# Patient Record
Sex: MALE | Race: WHITE | NOT HISPANIC OR LATINO | Employment: FULL TIME | URBAN - METROPOLITAN AREA
[De-identification: names, ages, dates, MRNs, and addresses within clinical notes are randomized per-mention and may not be internally consistent; named-entity substitution may affect disease eponyms.]

---

## 2025-04-15 ENCOUNTER — APPOINTMENT (OUTPATIENT)
Dept: RADIOLOGY | Facility: HOSPITAL | Age: 65
End: 2025-04-15
Payer: COMMERCIAL

## 2025-04-15 ENCOUNTER — HOSPITAL ENCOUNTER (EMERGENCY)
Facility: HOSPITAL | Age: 65
Discharge: HOME | End: 2025-04-15
Payer: COMMERCIAL

## 2025-04-15 VITALS
BODY MASS INDEX: 21.48 KG/M2 | HEART RATE: 80 BPM | RESPIRATION RATE: 17 BRPM | DIASTOLIC BLOOD PRESSURE: 92 MMHG | TEMPERATURE: 97.9 F | SYSTOLIC BLOOD PRESSURE: 184 MMHG | OXYGEN SATURATION: 97 % | HEIGHT: 69 IN | WEIGHT: 145 LBS

## 2025-04-15 DIAGNOSIS — M25.511 ACUTE PAIN OF RIGHT SHOULDER: Primary | ICD-10-CM

## 2025-04-15 PROCEDURE — 73000 X-RAY EXAM OF COLLAR BONE: CPT | Mod: RT

## 2025-04-15 PROCEDURE — 99284 EMERGENCY DEPT VISIT MOD MDM: CPT

## 2025-04-15 PROCEDURE — 73030 X-RAY EXAM OF SHOULDER: CPT | Mod: RT

## 2025-04-15 PROCEDURE — 2500000001 HC RX 250 WO HCPCS SELF ADMINISTERED DRUGS (ALT 637 FOR MEDICARE OP)

## 2025-04-15 PROCEDURE — 73030 X-RAY EXAM OF SHOULDER: CPT | Mod: RIGHT SIDE | Performed by: STUDENT IN AN ORGANIZED HEALTH CARE EDUCATION/TRAINING PROGRAM

## 2025-04-15 PROCEDURE — 73000 X-RAY EXAM OF COLLAR BONE: CPT | Mod: RIGHT SIDE | Performed by: STUDENT IN AN ORGANIZED HEALTH CARE EDUCATION/TRAINING PROGRAM

## 2025-04-15 RX ORDER — IBUPROFEN 600 MG/1
600 TABLET ORAL EVERY 6 HOURS PRN
Qty: 21 TABLET | Refills: 0 | Status: SHIPPED | OUTPATIENT
Start: 2025-04-15 | End: 2025-04-22

## 2025-04-15 RX ORDER — IBUPROFEN 600 MG/1
600 TABLET ORAL ONCE
Status: COMPLETED | OUTPATIENT
Start: 2025-04-15 | End: 2025-04-15

## 2025-04-15 RX ORDER — METHOCARBAMOL 500 MG/1
500 TABLET, FILM COATED ORAL 2 TIMES DAILY
Qty: 14 TABLET | Refills: 0 | Status: SHIPPED | OUTPATIENT
Start: 2025-04-15 | End: 2025-04-22

## 2025-04-15 RX ADMIN — IBUPROFEN 600 MG: 600 TABLET ORAL at 08:59

## 2025-04-15 ASSESSMENT — PAIN DESCRIPTION - LOCATION: LOCATION: SHOULDER

## 2025-04-15 ASSESSMENT — LIFESTYLE VARIABLES
EVER FELT BAD OR GUILTY ABOUT YOUR DRINKING: NO
EVER HAD A DRINK FIRST THING IN THE MORNING TO STEADY YOUR NERVES TO GET RID OF A HANGOVER: NO
TOTAL SCORE: 0
HAVE PEOPLE ANNOYED YOU BY CRITICIZING YOUR DRINKING: NO
HAVE YOU EVER FELT YOU SHOULD CUT DOWN ON YOUR DRINKING: NO

## 2025-04-15 ASSESSMENT — PAIN DESCRIPTION - ORIENTATION: ORIENTATION: RIGHT

## 2025-04-15 ASSESSMENT — PAIN DESCRIPTION - PAIN TYPE: TYPE: ACUTE PAIN

## 2025-04-15 ASSESSMENT — COLUMBIA-SUICIDE SEVERITY RATING SCALE - C-SSRS
6. HAVE YOU EVER DONE ANYTHING, STARTED TO DO ANYTHING, OR PREPARED TO DO ANYTHING TO END YOUR LIFE?: NO
2. HAVE YOU ACTUALLY HAD ANY THOUGHTS OF KILLING YOURSELF?: NO
1. IN THE PAST MONTH, HAVE YOU WISHED YOU WERE DEAD OR WISHED YOU COULD GO TO SLEEP AND NOT WAKE UP?: NO

## 2025-04-15 ASSESSMENT — PAIN - FUNCTIONAL ASSESSMENT: PAIN_FUNCTIONAL_ASSESSMENT: 0-10

## 2025-04-15 ASSESSMENT — PAIN SCALES - GENERAL: PAINLEVEL_OUTOF10: 10 - WORST POSSIBLE PAIN

## 2025-04-15 NOTE — ED PROVIDER NOTES
HPI   Chief Complaint   Patient presents with    Shoulder Injury       65 yr old male presents with R shoulder pain following an incident yesterday at work where a heavy gate started to fall and he reached out his R arm to try and stop it from falling. Directly after, he began experiencing moderate shoulder pain causing limited use and movement of shoulder. Denies numbness, tinging. Denies taking anticoags, any medications and has no known medical issues. Has not taken any medication for pain.               Patient History   History reviewed. No pertinent past medical history.  History reviewed. No pertinent surgical history.  No family history on file.  Social History     Tobacco Use    Smoking status: Not on file    Smokeless tobacco: Not on file   Substance Use Topics    Alcohol use: Not on file    Drug use: Not on file       Physical Exam   ED Triage Vitals [04/15/25 0758]   Temperature Heart Rate Respirations BP   36.6 °C (97.9 °F) 80 17 (!) 184/92      Pulse Ox Temp src Heart Rate Source Patient Position   97 % -- -- --      BP Location FiO2 (%)     -- --       Physical Exam  Constitutional:       Appearance: Normal appearance.   HENT:      Head: Normocephalic and atraumatic.      Mouth/Throat:      Pharynx: Oropharynx is clear.   Eyes:      Extraocular Movements: Extraocular movements intact.   Neck:      Comments: No pain to palpation, FAROM  Cardiovascular:      Rate and Rhythm: Normal rate and regular rhythm.      Pulses: Normal pulses.      Heart sounds: Normal heart sounds.   Pulmonary:      Effort: Pulmonary effort is normal.      Breath sounds: Normal breath sounds.   Musculoskeletal:         General: Tenderness present. No swelling, deformity or signs of injury.      Cervical back: Neck supple.      Comments: Tenderness to R shoulder with limited range of motion. No lacerations, swelling, or contusions.   Skin:     General: Skin is warm and dry.      Capillary Refill: Capillary refill takes less than  2 seconds.   Neurological:      Mental Status: He is alert and oriented to person, place, and time.   Psychiatric:         Mood and Affect: Mood normal.         Behavior: Behavior normal.           ED Course & MDM   Diagnoses as of 04/15/25 0939   Acute pain of right shoulder                 No data recorded     Alexander Coma Scale Score: 15 (04/15/25 0758 : Melita Kulkarni, PHILL)                           Medical Decision Making  65 yr old male with R shoulder pain following an accident at work where he reached out to stop a heavy gate from falling with his R arm. Denies falling, neck pain, headaches, chest pain, SOB or any other concerns outside of the shoulder pain today. He has limited ROM but pulses, cap refill and sensation remain intact in the RUE. Denies any medication use or chronic medical conditions. BP is elevated, potentially due to pain but encouraged pt to follow up with primary care for recheck. Pain managed with ibuprofen. Robaxin considered but pt declined. Xrays negative, discharged stable with a work note and referral to PCP and sports med/ortho.         Procedure  Procedures     Karolina Jefferson PA-C  04/15/25 0939

## 2025-04-15 NOTE — Clinical Note
Oemga Fox was seen and treated in our emergency department on 4/15/2025.  He may return to work on 04/17/2025.       If you have any questions or concerns, please don't hesitate to call.      Karolina Jefferson PA-C

## 2025-04-22 ENCOUNTER — OFFICE VISIT (OUTPATIENT)
Dept: ORTHOPEDIC SURGERY | Facility: CLINIC | Age: 65
End: 2025-04-22
Payer: COMMERCIAL

## 2025-04-22 DIAGNOSIS — S42.131A DISPLACED FRACTURE OF CORACOID PROCESS, RIGHT SHOULDER, INITIAL ENCOUNTER FOR CLOSED FRACTURE: ICD-10-CM

## 2025-04-22 DIAGNOSIS — S46.011A ROTATOR CUFF STRAIN, RIGHT, INITIAL ENCOUNTER: ICD-10-CM

## 2025-04-22 DIAGNOSIS — M25.511 ACUTE PAIN OF RIGHT SHOULDER: Primary | ICD-10-CM

## 2025-04-22 PROCEDURE — 99204 OFFICE O/P NEW MOD 45 MIN: CPT | Performed by: STUDENT IN AN ORGANIZED HEALTH CARE EDUCATION/TRAINING PROGRAM

## 2025-04-22 PROCEDURE — 99212 OFFICE O/P EST SF 10 MIN: CPT | Performed by: STUDENT IN AN ORGANIZED HEALTH CARE EDUCATION/TRAINING PROGRAM

## 2025-04-22 ASSESSMENT — PAIN SCALES - GENERAL: PAINLEVEL_OUTOF10: 8

## 2025-04-22 ASSESSMENT — PAIN - FUNCTIONAL ASSESSMENT: PAIN_FUNCTIONAL_ASSESSMENT: 0-10

## 2025-04-22 ASSESSMENT — PAIN DESCRIPTION - DESCRIPTORS: DESCRIPTORS: ACHING;THROBBING

## 2025-04-22 NOTE — PROGRESS NOTES
CHIEF COMPLAINT: No chief complaint on file.    History: 65 y.o. male presents to the office today for evaluation of his right shoulder.  Unfortunately, the patient had a work-related injury on April 14 involving a heavy gate.  Since that time, has been having significant limitations and pain in the right shoulder.  Prior to this, his right shoulder was functioning well.  He has not any specific treatments yet.  He is still working but light duty.  Pain is primarily anterior lateral of the right shoulder.    Past medical history, past surgical history, medications, allergies, family history, social history, and review of systems were reviewed today.    A 12 point review of systems was negative other than as stated in the HPI.    Medical History[1]     Allergies[2]     Surgical History[3]     Family History[4]     Social History     Socioeconomic History    Marital status: Single     Spouse name: Not on file    Number of children: Not on file    Years of education: Not on file    Highest education level: Not on file   Occupational History    Not on file   Tobacco Use    Smoking status: Not on file    Smokeless tobacco: Not on file   Substance and Sexual Activity    Alcohol use: Not on file    Drug use: Not on file    Sexual activity: Not on file   Other Topics Concern    Not on file   Social History Narrative    Not on file     Social Drivers of Health     Financial Resource Strain: Not on file   Food Insecurity: Not on file   Transportation Needs: Not on file   Physical Activity: Not on file   Stress: Not on file   Social Connections: Not on file   Intimate Partner Violence: Not on file   Housing Stability: Not on file        CURRENT MEDICATIONS:   Current Medications[5]    Physical Examination:      4/9/2020     9:37 AM 4/19/2020     9:09 AM 4/15/2025     7:58 AM   Vitals   Systolic 178 168 184   Diastolic 91 90 92   Heart Rate 79 74 80   Temp 36.7 °C (98 °F) 36.7 °C (98 °F) 36.6 °C (97.9 °F)   Resp 18 16 17  "  Height   1.753 m (5' 9\")   Weight (lb)   145   BMI   21.41 kg/m2   BSA (m2)   1.79 m2      There is no height or weight on file to calculate BMI.    Well-appearing, appears stated age, pleasant and cooperative, appropriate mood and behavior. Height and weight reviewed. Alert and oriented x3.  Auditory function intact.  No acute distress.  Intact ocular function, BRANDON, EOMI. Breathing is unlabored .  There is no evidence of jugular venous distension. Skin appearance is normal without evidence of rash or other lesions. 2+ radial pulses bilaterally, fingers pink and wwp, good capillary refill, no pitting edema. No appreciable lymphadenopathy in bilateral upper extremities. SILT throughout both upper extremities, median/radial/ulnar/musculocutaneous/axillary nerve motor and sensory intact (except for abnormalities noted in focused musculoskeletal exam section below).     Neck exam: Full range of motion of the neck in flexion/extension and rotational movements. No significant areas of tenderness to palpation in the neck.    On exam of bilateral upper extremities, very limited active range of motion of the right shoulder compared to the left.  Active forward flexion 90, external Tatian of 10, internal Tatian to the side.  Left she has forward flexion to 140, external Tatian to 70, internal Tatian T12.  Significant rotator cuff weakness on the right side, 4-5 supraspinatus strength, 4+ out of 5 external rotation strength.  Does have some tenderness over the anterior coracoid as well.    Imaging: Radiographs of the right shoulder reviewed today.  Personally interpreted by myself.  Glenohumeral joint space is preserved.  There does appear to be a fracture of the coracoid process    Assessment: Concern for right rotator cuff injury, coracoid fracture    Plan: Discussed with the patient that I do think there is concern for a potential rotator cuff injury given his exam.  Imaging also reveals a possible coracoid fracture.  " Given his work related injury, the fact that his shoulder was functioning well prior to the injury, I do think is reasonable to get an MRI to evaluate further the structures of the shoulder.  Once we get the MRI complete we can talk about other options.  We will ask for Worker's Compensation approval for this as well as some physical therapy.    Dragon software was used to dictate this note, please be aware that minor errors in transcription may be present.    Raffi Shelton MD    Shoulder/Elbow Surgery  [facility]            [1] History reviewed. No pertinent past medical history.  [2] No Known Allergies  [3] History reviewed. No pertinent surgical history.  [4] No family history on file.  [5]   Current Outpatient Medications   Medication Sig Dispense Refill    ibuprofen 600 mg tablet Take 1 tablet (600 mg) by mouth every 6 hours if needed for mild pain (1 - 3) for up to 7 days. 21 tablet 0    methocarbamol (Robaxin) 500 mg tablet Take 1 tablet (500 mg) by mouth 2 times a day for 7 days. 14 tablet 0     No current facility-administered medications for this visit.

## 2025-05-10 ENCOUNTER — HOSPITAL ENCOUNTER (OUTPATIENT)
Dept: RADIOLOGY | Facility: HOSPITAL | Age: 65
Discharge: HOME | End: 2025-05-10
Payer: COMMERCIAL

## 2025-05-10 ENCOUNTER — APPOINTMENT (OUTPATIENT)
Dept: RADIOLOGY | Facility: HOSPITAL | Age: 65
End: 2025-05-10
Payer: COMMERCIAL

## 2025-05-10 DIAGNOSIS — S42.131A DISPLACED FRACTURE OF CORACOID PROCESS, RIGHT SHOULDER, INITIAL ENCOUNTER FOR CLOSED FRACTURE: ICD-10-CM

## 2025-05-10 DIAGNOSIS — M25.511 ACUTE PAIN OF RIGHT SHOULDER: ICD-10-CM

## 2025-05-10 DIAGNOSIS — S46.011A ROTATOR CUFF STRAIN, RIGHT, INITIAL ENCOUNTER: ICD-10-CM

## 2025-05-10 PROCEDURE — 73221 MRI JOINT UPR EXTREM W/O DYE: CPT | Mod: RT

## 2025-05-10 PROCEDURE — 73221 MRI JOINT UPR EXTREM W/O DYE: CPT | Mod: RIGHT SIDE | Performed by: RADIOLOGY

## 2025-05-13 ENCOUNTER — APPOINTMENT (OUTPATIENT)
Dept: ORTHOPEDIC SURGERY | Facility: CLINIC | Age: 65
End: 2025-05-13
Payer: COMMERCIAL

## 2025-05-16 ENCOUNTER — APPOINTMENT (OUTPATIENT)
Dept: PRIMARY CARE | Facility: CLINIC | Age: 65
End: 2025-05-16
Payer: COMMERCIAL

## 2025-05-16 VITALS
WEIGHT: 169 LBS | HEIGHT: 70 IN | BODY MASS INDEX: 24.2 KG/M2 | DIASTOLIC BLOOD PRESSURE: 90 MMHG | SYSTOLIC BLOOD PRESSURE: 176 MMHG | TEMPERATURE: 97.1 F | RESPIRATION RATE: 18 BRPM | HEART RATE: 66 BPM | OXYGEN SATURATION: 97 %

## 2025-05-16 DIAGNOSIS — F17.200 TOBACCO USE DISORDER: ICD-10-CM

## 2025-05-16 DIAGNOSIS — R25.1 TREMOR: ICD-10-CM

## 2025-05-16 DIAGNOSIS — I10 PRIMARY HYPERTENSION: Primary | ICD-10-CM

## 2025-05-16 DIAGNOSIS — Z13.220 SCREENING FOR HYPERLIPIDEMIA: ICD-10-CM

## 2025-05-16 DIAGNOSIS — R20.2 PARESTHESIA OF RIGHT ARM: ICD-10-CM

## 2025-05-16 DIAGNOSIS — Z12.5 PROSTATE CANCER SCREENING: ICD-10-CM

## 2025-05-16 DIAGNOSIS — Z12.5 SCREENING FOR MALIGNANT NEOPLASM OF PROSTATE: ICD-10-CM

## 2025-05-16 DIAGNOSIS — Z13.1 SCREENING FOR DIABETES MELLITUS (DM): ICD-10-CM

## 2025-05-16 DIAGNOSIS — M25.511 ACUTE PAIN OF RIGHT SHOULDER: ICD-10-CM

## 2025-05-16 PROCEDURE — 3080F DIAST BP >= 90 MM HG: CPT

## 2025-05-16 PROCEDURE — 99205 OFFICE O/P NEW HI 60 MIN: CPT

## 2025-05-16 PROCEDURE — 3008F BODY MASS INDEX DOCD: CPT

## 2025-05-16 PROCEDURE — 3077F SYST BP >= 140 MM HG: CPT

## 2025-05-16 PROCEDURE — 99406 BEHAV CHNG SMOKING 3-10 MIN: CPT

## 2025-05-16 RX ORDER — LOSARTAN POTASSIUM 50 MG/1
50 TABLET ORAL DAILY
Qty: 30 TABLET | Refills: 0 | Status: SHIPPED | OUTPATIENT
Start: 2025-05-16

## 2025-05-16 ASSESSMENT — PATIENT HEALTH QUESTIONNAIRE - PHQ9
SUM OF ALL RESPONSES TO PHQ9 QUESTIONS 1 AND 2: 0
2. FEELING DOWN, DEPRESSED OR HOPELESS: NOT AT ALL
1. LITTLE INTEREST OR PLEASURE IN DOING THINGS: NOT AT ALL

## 2025-05-16 NOTE — PROGRESS NOTES
Subjective   Patient ID: Emanuel Fox is a 65 y.o. male who presents for Establish Care (New patient to establish care. No prev PCP. Was told his BP is high./Patient is not fasting.).    HPI   History of Present Illness  The patient is a 65-year-old male who presents today with complaints of shoulder pain, which may be work-related, and high blood pressure.    He was advised to seek medical attention for his elevated blood pressure, which was incidentally discovered during a visit for a work-related shoulder injury. He reports no symptoms of hypertension such as chest pain, vision changes, headache or shortness of breath. His blood pressure has consistently been slightly elevated during previous screenings, however he does not monitor it at home. He has never been prescribed antihypertensive medication. He has a 20-year history of smoking a pack a day and consumes caffeine and alcohol. He has considered smoking cessation but has never made a serious attempt. He has no history of other cardiac issues.      He experiences intermittent tremors in his right arm, which he describes as feeling like it is falling asleep. This symptom has been present for approximately one year and is not associated with his shoulder injury. 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 The tremors are unpredictable, occurring after months of absence, and can be triggered by stretching out his arm. He also reports a sensation of flushing in his arm prior to the onset of the tremors. He has a history of shoulder injuries from sports activities but reports no fractures.     PAST SURGICAL HISTORY:  Knee arthroscopy    SOCIAL HISTORY  He admits to smoking a pack a day for 20 years or more. He drinks alcohol.    FAMILY HISTORY  He is not aware of any family history of high blood pressure.       Review of Systems    Objective   /90 (BP Location: Right arm, Patient  "Position: Sitting)   Pulse 66   Temp 36.2 °C (97.1 °F)   Resp 18   Ht 1.778 m (5' 10\") Comment: patient reports  Wt 76.7 kg (169 lb)   SpO2 97%   BMI 24.25 kg/m²     Physical Exam  Constitutional:       General: He is not in acute distress.     Appearance: Normal appearance. He is not ill-appearing.   Eyes:      General: No scleral icterus.  Cardiovascular:      Rate and Rhythm: Normal rate and regular rhythm.      Heart sounds: No murmur heard.     No friction rub. No gallop.   Pulmonary:      Effort: Pulmonary effort is normal. No respiratory distress.      Breath sounds: Wheezing present. No rhonchi or rales.   Musculoskeletal:      Right lower leg: No edema.      Left lower leg: No edema.      Comments: Pulses are symmetric in bilateral upper extremities, no significant lower.  Range of motion of the right upper extremity.   Skin:     Comments: Notable for livedo reticularis of bilateral extremities   Neurological:      General: No focal deficit present.      Mental Status: He is alert and oriented to person, place, and time.   Psychiatric:         Mood and Affect: Mood normal.         Behavior: Behavior normal.       Physical Exam  Respiratory: Mild wheezing noted  Cardiovascular: Heart exam performed       Assessment/Plan   Problem List Items Addressed This Visit    None  Visit Diagnoses         Codes      Primary hypertension    -  Primary I10    Relevant Medications    losartan (Cozaar) 50 mg tablet    Other Relevant Orders    Comprehensive Metabolic Panel    TSH with reflex to Free T4 if abnormal    CBC and Auto Differential      Acute pain of right shoulder     M25.511      Prostate cancer screening     Z12.5    Relevant Orders    Prostate Specific Antigen      Tremor     R25.1    Relevant Orders    EMG & nerve conduction      Paresthesia of right arm     R20.2    Relevant Orders    Magnesium    EMG & nerve conduction    Vitamin B12    Folate      Screening for diabetes mellitus (DM)     Z13.1    " Relevant Orders    Hemoglobin A1C      Screening for malignant neoplasm of prostate     Z12.5      Screening for hyperlipidemia     Z13.220    Relevant Orders    Lipid Panel          Assessment & Plan  1. Hypertension.  - His blood pressure readings have consistently been in the 170s, necessitating immediate intervention.  - The potential risks associated with untreated hypertension, including stroke, myocardial infarction, and renal damage, were discussed.  - He will commence losartan therapy and monitor his blood pressure at home three times weekly, twice daily, once in the morning and once before supper. He was instructed to avoid smoking and caffeine consumption 30 minutes prior to each reading.  - Provided dietary handout on DASH diet  - A comprehensive blood workup will be ordered to assess his overall health status.  - To patient develop any acute chest pain, shortness of breath, headache, vision changes recommend emergency department.    2.  Tobacco use disorder  - Cessation encouraged.  - Physiologic and physical aspects of tobacco addiction as well as strategies for quitting were discussed.   - Counseling was given focusing on the harmful effects of this addiction especially given the patient's medical condition(s) which will be worsened because of the chemicals in tobacco.   -Discussed tobacco cessation for approximately 5 minutes  - Will initiate varenicline after follow-up visit to avoid potential confounding with any side effects should they arise      3. Right arm tremor/paresthesia.  - The possibility of electrolyte imbalances contributing to his symptoms was considered.  - Physical examination revealed no significant concerning neurovascular features.  The notable, has a previous work-related injury within right shoulder however symptoms have persisted longer than acute injury.  And the patient reported intermittent tremors in the right arm, which sometimes feels like it is falling asleep.  - An EMG  will be conducted to evaluate the nerve function in his right arm. A magnesium level test will also be ordered to check for any abnormalities.  - The patient was advised that structural issues such as arthritis within shoulder or neck could be contributing to his symptoms.      Follow-up  As patient to follow (the next 2 weeks however states that he has difficulty in taking time off work however should be available the patient will follow up in 1 month time or sooner if any acute concerns arise.  Will discuss wellness examination along with preventative screenings at this visit along with follow-up for blood pressure          Manjeet Lares, DO    This medical note was created with the assistance of artificial intelligence (AI) for documentation purposes. The content has been reviewed and confirmed by the healthcare provider for accuracy and completeness. Patient consented to the use of audio recording and use of AI during their visit.

## 2025-05-16 NOTE — PATIENT INSTRUCTIONS
Please begin to monitor blood pressure every other day.  Recommend that you check it in the morning as well as at night.  Avoid any caffeine or nicotine 30 minutes prior to checking your blood pressure.  Of both feet flat on the floor and arm that you are checking blood pressure in approximately the level of the heart.  Purchase a blood pressure cuff, not a wrist monitor as these are less accurate.  Record your blood pressures and please bring both the device as well as your home blood pressure log to your next appointment.

## 2025-05-27 ENCOUNTER — OFFICE VISIT (OUTPATIENT)
Dept: ORTHOPEDIC SURGERY | Facility: CLINIC | Age: 65
End: 2025-05-27
Payer: COMMERCIAL

## 2025-05-27 DIAGNOSIS — M25.511 ACUTE PAIN OF RIGHT SHOULDER: ICD-10-CM

## 2025-05-27 DIAGNOSIS — S46.011A ROTATOR CUFF STRAIN, RIGHT, INITIAL ENCOUNTER: Primary | ICD-10-CM

## 2025-05-27 PROCEDURE — 99212 OFFICE O/P EST SF 10 MIN: CPT | Performed by: STUDENT IN AN ORGANIZED HEALTH CARE EDUCATION/TRAINING PROGRAM

## 2025-05-27 PROCEDURE — 99215 OFFICE O/P EST HI 40 MIN: CPT | Performed by: STUDENT IN AN ORGANIZED HEALTH CARE EDUCATION/TRAINING PROGRAM

## 2025-05-27 ASSESSMENT — PAIN SCALES - GENERAL: PAINLEVEL_OUTOF10: 5 - MODERATE PAIN

## 2025-05-27 ASSESSMENT — PAIN - FUNCTIONAL ASSESSMENT: PAIN_FUNCTIONAL_ASSESSMENT: 0-10

## 2025-05-27 NOTE — LETTER
May 27, 2025     Patient: Omega Fox   YOB: 1960   Date of Visit: 5/27/2025       To Whom It May Concern:    It is my medical opinion that Omega Fox will need to continue light duty and please allow him time off to go to physical therapy due to his right shoulder injury.    If you have any questions or concerns, please don't hesitate to [phone]         Sincerely,        Raffi Shelton MD    CC: No Recipients

## 2025-05-27 NOTE — PROGRESS NOTES
CHIEF COMPLAINT: No chief complaint on file.    History: 65 y.o. male returns to the office for follow up of his right shoulder.  Patient has been  working light duty.  Still has some pain in the shoulder but has been protecting it.  Here today to review his MRI.    4/22/25: presents to the office today for evaluation of his right shoulder.  Unfortunately, the patient had a work-related injury on April 14 involving a heavy gate.  Since that time, has been having significant limitations and pain in the right shoulder.  Prior to this, his right shoulder was functioning well.  He has not any specific treatments yet.  He is still working but light duty.  Pain is primarily anterior lateral of the right shoulder.    Past medical history, past surgical history, medications, allergies, family history, social history, and review of systems were reviewed today.    A 12 point review of systems was negative other than as stated in the HPI.    Medical History[1]     Allergies[2]     Surgical History[3]     Family History[4]     Social History     Socioeconomic History    Marital status: Single     Spouse name: Not on file    Number of children: Not on file    Years of education: Not on file    Highest education level: Not on file   Occupational History    Not on file   Tobacco Use    Smoking status: Every Day     Types: Cigarettes    Smokeless tobacco: Never   Substance and Sexual Activity    Alcohol use: Yes    Drug use: Never    Sexual activity: Not on file   Other Topics Concern    Not on file   Social History Narrative    Not on file     Social Drivers of Health     Financial Resource Strain: Not on file   Food Insecurity: Not on file   Transportation Needs: Not on file   Physical Activity: Not on file   Stress: Not on file   Social Connections: Not on file   Intimate Partner Violence: Not on file   Housing Stability: Not on file        CURRENT MEDICATIONS:   Current Medications[5]    Physical Examination:      4/9/2020      "9:37 AM 4/19/2020     9:09 AM 4/15/2025     7:58 AM 5/16/2025     8:11 AM   Vitals   Systolic 178 168 184 176   Diastolic 91 90 92 90   BP Location    Right arm   Heart Rate 79 74 80 66   Temp 36.7 °C (98 °F) 36.7 °C (98 °F) 36.6 °C (97.9 °F) 36.2 °C (97.1 °F)   Resp 18 16 17 18   Height   1.753 m (5' 9\") 1.778 m (5' 10\")   Weight (lb)   145 169   BMI   21.41 kg/m2 24.25 kg/m2   BSA (m2)   1.79 m2 1.95 m2   Visit Report    Report      There is no height or weight on file to calculate BMI.    Well-appearing, appears stated age, pleasant and cooperative, appropriate mood and behavior. Height and weight reviewed. Alert and oriented x3.  Auditory function intact.  No acute distress.  Intact ocular function, BRANDON, EOMI. Breathing is unlabored .  There is no evidence of jugular venous distension. Skin appearance is normal without evidence of rash or other lesions. 2+ radial pulses bilaterally, fingers pink and wwp, good capillary refill, no pitting edema. No appreciable lymphadenopathy in bilateral upper extremities. SILT throughout both upper extremities, median/radial/ulnar/musculocutaneous/axillary nerve motor and sensory intact (except for abnormalities noted in focused musculoskeletal exam section below).     Neck exam: Full range of motion of the neck in flexion/extension and rotational movements. No significant areas of tenderness to palpation in the neck.    On exam of bilateral upper extremities, symmetric ROM today, bilateral forward flexion to 140, external Tatian to 70, internal Tatian T12.  Only has some mild right rotator cuff weakness today, overall strength is essentially symmetric.      Imaging: MRI of the right shoulder  reviewed today.  Personally interpreted by myself.  The rotator cuff is intact.  There is a tear of the long head of the biceps tendon.  There is some labral tearing as well.    Assessment: Long head of the biceps rupture    Plan: Long discussion with the patient with treatment options " and diagnosis.  The patient does have a intact rotator cuff.  He does have tearing of the long head of the biceps tendon.  He also has some labral tearing.  I discussed with the patient that based on his MRI, I would recommend physical therapy first.  We had an extensive discussion that long head of the biceps ruptures do very well with nonoperative treatment and the vast majority of patients do not need surgery for this.  Discussed that if he develops cramping pain after physical therapy, then surgery may be an option, but we usually do not go straight to surgery for this.  The vast majority of these cases, although there will be a cosmetic deformity, the biceps tendon will scar end, and there is no functional or strength loss with long head of the biceps ruptures in the clinical literature.  Physical therapy was ordered today.  Patient should remain on light duty for the next 2 months.  He should be given time off to go to physical therapy.  Will see him in 2 months to see how he is doing.    This is a Worker's Compensation case.  We did provide a work note.  We also communicated with our Worker's Compensation team.    Total patient encounter took >40 minutes, including review of the medical record and all previous tests and notes related to this condition, any outside notes, recent or new imaging, as well as the patient interview, documentation in the EMR, placement of orders or any requested physician documentation and coordination of care.       Dragon software was used to dictate this note, please be aware that minor errors in transcription may be present.    Raffi Shelton MD    Shoulder/Elbow Surgery  [place]            [1] No past medical history on file.  [2] No Known Allergies  [3]   Past Surgical History:  Procedure Laterality Date    KNEE SURGERY Right     when he was a child   [4]   Family History  Problem Relation Name Age of Onset     No Known Problems Mother      Pancreatitis Father     [5]   Current Outpatient Medications   Medication Sig Dispense Refill    losartan (Cozaar) 50 mg tablet Take 1 tablet (50 mg) by mouth once daily. 30 tablet 0     No current facility-administered medications for this visit.

## 2025-06-12 DIAGNOSIS — I10 PRIMARY HYPERTENSION: ICD-10-CM

## 2025-06-12 RX ORDER — LOSARTAN POTASSIUM 50 MG/1
50 TABLET ORAL DAILY
Qty: 30 TABLET | Refills: 0 | Status: SHIPPED | OUTPATIENT
Start: 2025-06-12 | End: 2025-06-13 | Stop reason: SDUPTHER

## 2025-06-13 ENCOUNTER — APPOINTMENT (OUTPATIENT)
Dept: PRIMARY CARE | Facility: CLINIC | Age: 65
End: 2025-06-13
Payer: COMMERCIAL

## 2025-06-13 ENCOUNTER — TELEPHONE (OUTPATIENT)
Dept: PRIMARY CARE | Facility: CLINIC | Age: 65
End: 2025-06-13

## 2025-06-13 DIAGNOSIS — I10 PRIMARY HYPERTENSION: ICD-10-CM

## 2025-06-13 RX ORDER — LOSARTAN POTASSIUM 50 MG/1
50 TABLET ORAL DAILY
Qty: 30 TABLET | Refills: 0 | OUTPATIENT
Start: 2025-06-13

## 2025-06-13 RX ORDER — LOSARTAN POTASSIUM 50 MG/1
50 TABLET ORAL DAILY
Qty: 90 TABLET | Refills: 0 | Status: SHIPPED | OUTPATIENT
Start: 2025-06-13

## 2025-06-13 NOTE — TELEPHONE ENCOUNTER
Pt called and stated that the pharmacy had an issue with his Losartan script  and to contact the doctor's office. I called the pharmacy and they stated that the pt's insurance requires a 90 day supply. Can you send in a 90 day supply to the [facility] in [place]?     Medication:     Losartan 50 MG; Take 1 tablet every day.     Pharmacy: [facility]   Phone Number: [phone]

## 2025-06-14 LAB
ALBUMIN SERPL-MCNC: 4.3 G/DL (ref 3.6–5.1)
ALP SERPL-CCNC: 59 U/L (ref 35–144)
ALT SERPL-CCNC: 13 U/L (ref 9–46)
ANION GAP SERPL CALCULATED.4IONS-SCNC: 7 MMOL/L (CALC) (ref 7–17)
AST SERPL-CCNC: 12 U/L (ref 10–35)
BASOPHILS # BLD AUTO: 56 CELLS/UL (ref 0–200)
BASOPHILS NFR BLD AUTO: 0.6 %
BILIRUB SERPL-MCNC: 0.7 MG/DL (ref 0.2–1.2)
BUN SERPL-MCNC: 16 MG/DL (ref 7–25)
CALCIUM SERPL-MCNC: 9.4 MG/DL (ref 8.6–10.3)
CHLORIDE SERPL-SCNC: 103 MMOL/L (ref 98–110)
CHOLEST SERPL-MCNC: 149 MG/DL
CHOLEST/HDLC SERPL: 2.5 (CALC)
CO2 SERPL-SCNC: 29 MMOL/L (ref 20–32)
CREAT SERPL-MCNC: 0.92 MG/DL (ref 0.7–1.35)
EGFRCR SERPLBLD CKD-EPI 2021: 93 ML/MIN/1.73M2
EOSINOPHIL # BLD AUTO: 242 CELLS/UL (ref 15–500)
EOSINOPHIL NFR BLD AUTO: 2.6 %
ERYTHROCYTE [DISTWIDTH] IN BLOOD BY AUTOMATED COUNT: 12.9 % (ref 11–15)
EST. AVERAGE GLUCOSE BLD GHB EST-MCNC: 114 MG/DL
EST. AVERAGE GLUCOSE BLD GHB EST-SCNC: 6.3 MMOL/L
FOLATE SERPL-MCNC: 9.6 NG/ML
GLUCOSE SERPL-MCNC: 92 MG/DL (ref 65–99)
HBA1C MFR BLD: 5.6 %
HCT VFR BLD AUTO: 51 % (ref 38.5–50)
HDLC SERPL-MCNC: 60 MG/DL
HGB BLD-MCNC: 16.9 G/DL (ref 13.2–17.1)
LDLC SERPL CALC-MCNC: 74 MG/DL (CALC)
LYMPHOCYTES # BLD AUTO: 2306 CELLS/UL (ref 850–3900)
LYMPHOCYTES NFR BLD AUTO: 24.8 %
MAGNESIUM SERPL-MCNC: 2.3 MG/DL (ref 1.5–2.5)
MCH RBC QN AUTO: 33.2 PG (ref 27–33)
MCHC RBC AUTO-ENTMCNC: 33.1 G/DL (ref 32–36)
MCV RBC AUTO: 100.2 FL (ref 80–100)
MONOCYTES # BLD AUTO: 1311 CELLS/UL (ref 200–950)
MONOCYTES NFR BLD AUTO: 14.1 %
NEUTROPHILS # BLD AUTO: 5385 CELLS/UL (ref 1500–7800)
NEUTROPHILS NFR BLD AUTO: 57.9 %
NONHDLC SERPL-MCNC: 89 MG/DL (CALC)
PLATELET # BLD AUTO: 364 THOUSAND/UL (ref 140–400)
PMV BLD REES-ECKER: 10.3 FL (ref 7.5–12.5)
POTASSIUM SERPL-SCNC: 5.3 MMOL/L (ref 3.5–5.3)
PROT SERPL-MCNC: 6.8 G/DL (ref 6.1–8.1)
PSA SERPL-MCNC: 3.15 NG/ML
RBC # BLD AUTO: 5.09 MILLION/UL (ref 4.2–5.8)
SODIUM SERPL-SCNC: 139 MMOL/L (ref 135–146)
TRIGL SERPL-MCNC: 72 MG/DL
TSH SERPL-ACNC: 3.91 MIU/L (ref 0.4–4.5)
VIT B12 SERPL-MCNC: 266 PG/ML (ref 200–1100)
WBC # BLD AUTO: 9.3 THOUSAND/UL (ref 3.8–10.8)

## 2025-06-25 ENCOUNTER — APPOINTMENT (OUTPATIENT)
Dept: PRIMARY CARE | Facility: CLINIC | Age: 65
End: 2025-06-25
Payer: COMMERCIAL

## 2025-06-27 ENCOUNTER — APPOINTMENT (OUTPATIENT)
Dept: PRIMARY CARE | Facility: CLINIC | Age: 65
End: 2025-06-27
Payer: COMMERCIAL

## 2025-06-27 VITALS
HEIGHT: 70 IN | HEART RATE: 78 BPM | RESPIRATION RATE: 18 BRPM | OXYGEN SATURATION: 98 % | DIASTOLIC BLOOD PRESSURE: 80 MMHG | WEIGHT: 162 LBS | SYSTOLIC BLOOD PRESSURE: 142 MMHG | BODY MASS INDEX: 23.19 KG/M2 | TEMPERATURE: 97.3 F

## 2025-06-27 DIAGNOSIS — Z12.11 ENCOUNTER FOR SCREENING FOR MALIGNANT NEOPLASM OF COLON: Primary | ICD-10-CM

## 2025-06-27 DIAGNOSIS — F17.200 TOBACCO USE DISORDER: ICD-10-CM

## 2025-06-27 DIAGNOSIS — I10 PRIMARY HYPERTENSION: ICD-10-CM

## 2025-06-27 PROCEDURE — 99214 OFFICE O/P EST MOD 30 MIN: CPT

## 2025-06-27 PROCEDURE — 3077F SYST BP >= 140 MM HG: CPT

## 2025-06-27 PROCEDURE — 3008F BODY MASS INDEX DOCD: CPT

## 2025-06-27 PROCEDURE — 3079F DIAST BP 80-89 MM HG: CPT

## 2025-06-27 RX ORDER — VARENICLINE TARTRATE 0.5 (11)-1
KIT ORAL
Qty: 53 EACH | Refills: 0 | Status: SHIPPED | OUTPATIENT
Start: 2025-06-27

## 2025-06-27 RX ORDER — AMLODIPINE BESYLATE 5 MG/1
5 TABLET ORAL DAILY
Qty: 60 TABLET | Refills: 0 | Status: SHIPPED | OUTPATIENT
Start: 2025-06-27 | End: 2025-08-26

## 2025-06-27 SDOH — ECONOMIC STABILITY: INCOME INSECURITY: IN THE LAST 12 MONTHS, WAS THERE A TIME WHEN YOU WERE NOT ABLE TO PAY THE MORTGAGE OR RENT ON TIME?: NO

## 2025-06-27 SDOH — ECONOMIC STABILITY: FOOD INSECURITY: WITHIN THE PAST 12 MONTHS, YOU WORRIED THAT YOUR FOOD WOULD RUN OUT BEFORE YOU GOT MONEY TO BUY MORE.: NEVER TRUE

## 2025-06-27 SDOH — ECONOMIC STABILITY: FOOD INSECURITY: WITHIN THE PAST 12 MONTHS, THE FOOD YOU BOUGHT JUST DIDN'T LAST AND YOU DIDN'T HAVE MONEY TO GET MORE.: NEVER TRUE

## 2025-06-27 SDOH — ECONOMIC STABILITY: TRANSPORTATION INSECURITY
IN THE PAST 12 MONTHS, HAS LACK OF TRANSPORTATION KEPT YOU FROM MEETINGS, WORK, OR FROM GETTING THINGS NEEDED FOR DAILY LIVING?: NO

## 2025-06-27 SDOH — HEALTH STABILITY: PHYSICAL HEALTH: ON AVERAGE, HOW MANY MINUTES DO YOU ENGAGE IN EXERCISE AT THIS LEVEL?: 60 MIN

## 2025-06-27 SDOH — ECONOMIC STABILITY: GENERAL
WHICH OF THE FOLLOWING DO YOU KNOW HOW TO USE AND HAVE ACCESS TO EVERY DAY? (CHOOSE ALL THAT APPLY): DESKTOP COMPUTER, LAPTOP COMPUTER, OR TABLET WITH BROADBAND INTERNET CONNECTION;SMARTPHONE WITH CELLULAR DATA PLAN

## 2025-06-27 SDOH — HEALTH STABILITY: PHYSICAL HEALTH: ON AVERAGE, HOW MANY DAYS PER WEEK DO YOU ENGAGE IN MODERATE TO STRENUOUS EXERCISE (LIKE A BRISK WALK)?: 7 DAYS

## 2025-06-27 SDOH — ECONOMIC STABILITY: TRANSPORTATION INSECURITY
IN THE PAST 12 MONTHS, HAS THE LACK OF TRANSPORTATION KEPT YOU FROM MEDICAL APPOINTMENTS OR FROM GETTING MEDICATIONS?: NO

## 2025-06-27 ASSESSMENT — SOCIAL DETERMINANTS OF HEALTH (SDOH)
HOW OFTEN DO YOU ATTENT MEETINGS OF THE CLUB OR ORGANIZATION YOU BELONG TO?: NEVER
HOW OFTEN DO YOU GET TOGETHER WITH FRIENDS OR RELATIVES?: MORE THAN THREE TIMES A WEEK
HOW OFTEN DO YOU ATTEND CHURCH OR RELIGIOUS SERVICES?: MORE THAN 4 TIMES PER YEAR
IN A TYPICAL WEEK, HOW MANY TIMES DO YOU TALK ON THE PHONE WITH FAMILY, FRIENDS, OR NEIGHBORS?: MORE THAN THREE TIMES A WEEK
WITHIN THE LAST YEAR, HAVE YOU BEEN AFRAID OF YOUR PARTNER OR EX-PARTNER?: NO
WITHIN THE LAST YEAR, HAVE TO BEEN RAPED OR FORCED TO HAVE ANY KIND OF SEXUAL ACTIVITY BY YOUR PARTNER OR EX-PARTNER?: NO
IN THE PAST 12 MONTHS, HAS THE ELECTRIC, GAS, OIL, OR WATER COMPANY THREATENED TO SHUT OFF SERVICE IN YOUR HOME?: NO
WITHIN THE LAST YEAR, HAVE YOU BEEN KICKED, HIT, SLAPPED, OR OTHERWISE PHYSICALLY HURT BY YOUR PARTNER OR EX-PARTNER?: NO
DO YOU BELONG TO ANY CLUBS OR ORGANIZATIONS SUCH AS CHURCH GROUPS UNIONS, FRATERNAL OR ATHLETIC GROUPS, OR SCHOOL GROUPS?: NO
WITHIN THE LAST YEAR, HAVE YOU BEEN HUMILIATED OR EMOTIONALLY ABUSED IN OTHER WAYS BY YOUR PARTNER OR EX-PARTNER?: NO
HOW HARD IS IT FOR YOU TO PAY FOR THE VERY BASICS LIKE FOOD, HOUSING, MEDICAL CARE, AND HEATING?: NOT HARD AT ALL

## 2025-06-27 ASSESSMENT — LIFESTYLE VARIABLES
SKIP TO QUESTIONS 9-10: 1
AUDIT-C TOTAL SCORE: 3
HOW OFTEN DO YOU HAVE SIX OR MORE DRINKS ON ONE OCCASION: NEVER
HOW MANY STANDARD DRINKS CONTAINING ALCOHOL DO YOU HAVE ON A TYPICAL DAY: 1 OR 2
HOW OFTEN DO YOU HAVE A DRINK CONTAINING ALCOHOL: 2-3 TIMES A WEEK

## 2025-06-27 ASSESSMENT — PATIENT HEALTH QUESTIONNAIRE - PHQ9
SUM OF ALL RESPONSES TO PHQ9 QUESTIONS 1 & 2: 0
2. FEELING DOWN, DEPRESSED OR HOPELESS: NOT AT ALL
1. LITTLE INTEREST OR PLEASURE IN DOING THINGS: NOT AT ALL

## 2025-07-01 ENCOUNTER — APPOINTMENT (OUTPATIENT)
Dept: PHYSICAL THERAPY | Facility: CLINIC | Age: 65
End: 2025-07-01
Payer: COMMERCIAL

## 2025-07-02 ENCOUNTER — EVALUATION (OUTPATIENT)
Dept: PHYSICAL THERAPY | Facility: CLINIC | Age: 65
End: 2025-07-02
Payer: COMMERCIAL

## 2025-07-02 DIAGNOSIS — S42.131A CLOSED DISPLACED FRACTURE OF CORACOID PROCESS OF RIGHT SHOULDER, INITIAL ENCOUNTER: ICD-10-CM

## 2025-07-02 DIAGNOSIS — S46.011A STRAIN OF TENDON OF RIGHT ROTATOR CUFF, INITIAL ENCOUNTER: ICD-10-CM

## 2025-07-02 PROCEDURE — 97110 THERAPEUTIC EXERCISES: CPT | Mod: GP | Performed by: PHYSICAL THERAPIST

## 2025-07-02 PROCEDURE — 97161 PT EVAL LOW COMPLEX 20 MIN: CPT | Mod: GP | Performed by: PHYSICAL THERAPIST

## 2025-07-02 NOTE — PROGRESS NOTES
"Physical Therapy Evaluation    Patient Name: Omega Fox \"Emanuel\"  MRN: [id number]  Today's Date: 7/6/2025  Time Calculation  Start Time: 0324  Stop Time: 0402  Time Calculation (min): 38 min    Visit #: 1  Visits approved: 12  Certification dates: 4/22/25-8/31/25.    Precautions:  Precautions  Precautions Comment: None. Low fall risk.    Subjective/History         DOI: 5/17/25.  Mechanism of injury: Tried to stop a large metal gait from falling- right shoulder forced into extension.   Area of symptoms: Sharp or dull pain deep in right shoulder.     Aggravating factors: Reach up. Reach behind back. Right sidelying. Reach in front. Lift ceiling tiles for work.  Easing factors: Ice.   Red flags: None.   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   Recreation/Hobbies/Sports: Golf- avoiding.  Living situation: Unremarkable.   Barriers to treatment: None.   Preferred language: English.     Objective Findings  Observation/gait: Stands with mod right shoulder depression.  AROM Shoulder flex: wnl- pain. Abd: wnl- pain. ER: wnl- min pain. Hbb: ~L2- pain. Left ~T10.  Muscle activation/Resisted testing: Abd, IR, ER all wnl. Speeds: 4-/5- pain.   Special tests: Cayla-Emiliano: Pos for pain. Dawson-reloc: Neg. Passive ACJ compression: pos for pain.   Palpation: TTP ACJ, LH biceps.     Treatment:   IV-- GH caud on abd// AROM abd: wnl- decr pain.   Therex: Tband row, Tband bilat ER with elbows on table- each to HEP 2x30, 2x/day.    Assessment  Patient presents with positive resisted testing and palpation findings consistent with LH biceps tendinopathy, ACJ dysfunction.    Plan  Physical therapy 1-2 times/week for 6-8 wks. Therapy may include the following: Therapeutic exercise, manual therapy, education/home program.     Goals: Reach up into high cupboard w/o pain. Reach behind back for dressing w/o pain. Lie on right side for sleeping w/o pain. Lift ceiling tiles as necessary for work w/o pain.       "

## 2025-07-06 PROBLEM — S46.011A STRAIN OF TENDON OF RIGHT ROTATOR CUFF: Status: ACTIVE | Noted: 2025-07-06

## 2025-07-08 ENCOUNTER — TREATMENT (OUTPATIENT)
Dept: PHYSICAL THERAPY | Facility: CLINIC | Age: 65
End: 2025-07-08
Payer: COMMERCIAL

## 2025-07-08 DIAGNOSIS — S46.011D STRAIN OF TENDON OF RIGHT ROTATOR CUFF, SUBSEQUENT ENCOUNTER: ICD-10-CM

## 2025-07-08 DIAGNOSIS — S42.131D CLOSED DISPLACED FRACTURE OF CORACOID PROCESS OF RIGHT SHOULDER WITH ROUTINE HEALING, SUBSEQUENT ENCOUNTER: ICD-10-CM

## 2025-07-08 PROCEDURE — 97110 THERAPEUTIC EXERCISES: CPT | Mod: GP | Performed by: PHYSICAL THERAPIST

## 2025-07-08 PROCEDURE — 97140 MANUAL THERAPY 1/> REGIONS: CPT | Mod: GP | Performed by: PHYSICAL THERAPIST

## 2025-07-08 NOTE — PROGRESS NOTES
"Physical Therapy Follow-up    Patient Name: Omega Fox \"Emanuel\"  MRN: [id number]  Today's Date: 7/8/2025  Time Calculation  Start Time: 0405  Stop Time: 0446  Time Calculation (min): 41 min      Visit#: 2. 12 approved.  Cert dates: 4/22/25-8/31/25.    Precautions: None.     Subjective: 5/10 resting pain today. All reaching ~ same. HEP going well.    Objective: AROM shoulder flex: wnl- pain. Abd: wnl- pain.     Treatment:   Therapeutic exercise: Tband row, Tband ER with elbows on table- demo'd correctly.   Tband IR- HEP 30x, 2x/day.   Prone scap 90- HEP 20x, 2x/day.  Sleeper stretch x 30 sec// Flex: no change. Abd: no change.      Manual therapy: IV- GH caud and AP in flex and abd// Flex: no change. Abd: No change.      Other: Reviewed reaching mechanics.    Assessment: Demo's HEP correctly. No change with jt mobs.    Plan: Incr shoulder stabilization exer.       " See test results,

## 2025-07-11 DIAGNOSIS — R19.5 POSITIVE COLORECTAL CANCER SCREENING USING COLOGUARD TEST: Primary | ICD-10-CM

## 2025-07-11 LAB — NONINV COLON CA DNA+OCC BLD SCRN STL QL: POSITIVE
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
## 2025-07-15 ENCOUNTER — TREATMENT (OUTPATIENT)
Dept: PHYSICAL THERAPY | Facility: CLINIC | Age: 65
End: 2025-07-15
Payer: COMMERCIAL

## 2025-07-15 DIAGNOSIS — S42.131D CLOSED DISPLACED FRACTURE OF CORACOID PROCESS OF RIGHT SHOULDER WITH ROUTINE HEALING, SUBSEQUENT ENCOUNTER: ICD-10-CM

## 2025-07-15 DIAGNOSIS — S46.011D STRAIN OF TENDON OF RIGHT ROTATOR CUFF, SUBSEQUENT ENCOUNTER: ICD-10-CM

## 2025-07-15 PROCEDURE — 97110 THERAPEUTIC EXERCISES: CPT | Mod: GP | Performed by: PHYSICAL THERAPIST

## 2025-07-15 PROCEDURE — 97140 MANUAL THERAPY 1/> REGIONS: CPT | Mod: GP | Performed by: PHYSICAL THERAPIST

## 2025-07-15 NOTE — PROGRESS NOTES
"Physical Therapy Follow-up    Patient Name: Omega Fox \"Emanuel\"  MRN: [id number]  Today's Date: 7/15/2025  Time Calculation  Start Time: 0150  Stop Time: 0230  Time Calculation (min): 40 min      Visit#: 3. 12 approved.  Cert dates: 4/22/25-8/31/25.    Precautions: None.     Subjective: 5/10 resting pain today. Slight, temp decr sxs following jt mobs last time. All reaching ~ same. HEP going well.    Objective: AROM shoulder flex: ~150- pain. Abd: ~135- pain.     Treatment:   Therapeutic exercise: Tband row, Tband ER with elbows on table, prone scap 45, Tband IR, - demo'd correctly.    Manual therapy: IV- GH caud and AP in flex and abd// Flex: ~165. Abd: ~100.      Other: Reviewed reaching mechanics.    Assessment: Demo's HEP correctly. Incr flex, decr abd with jt mobs.    Plan: Incr shoulder stabilization exer.       "

## 2025-07-22 ENCOUNTER — TREATMENT (OUTPATIENT)
Dept: PHYSICAL THERAPY | Facility: CLINIC | Age: 65
End: 2025-07-22
Payer: COMMERCIAL

## 2025-07-22 ENCOUNTER — APPOINTMENT (OUTPATIENT)
Dept: ORTHOPEDIC SURGERY | Facility: CLINIC | Age: 65
End: 2025-07-22
Payer: COMMERCIAL

## 2025-07-22 DIAGNOSIS — S42.131D CLOSED DISPLACED FRACTURE OF CORACOID PROCESS OF RIGHT SHOULDER WITH ROUTINE HEALING, SUBSEQUENT ENCOUNTER: ICD-10-CM

## 2025-07-22 DIAGNOSIS — S46.011D STRAIN OF TENDON OF RIGHT ROTATOR CUFF, SUBSEQUENT ENCOUNTER: ICD-10-CM

## 2025-07-22 PROCEDURE — 97140 MANUAL THERAPY 1/> REGIONS: CPT | Mod: GP | Performed by: PHYSICAL THERAPIST

## 2025-07-22 PROCEDURE — 97110 THERAPEUTIC EXERCISES: CPT | Mod: GP | Performed by: PHYSICAL THERAPIST

## 2025-07-22 NOTE — PROGRESS NOTES
"Physical Therapy Follow-up    Patient Name: Omega Fox \"Emanuel\"  MRN: [id number]  Today's Date: 7/22/2025  Time Calculation  Start Time: 0148  Stop Time: 0227  Time Calculation (min): 39 min      Visit#: 4. 12 approved.  Cert dates: 4/22/25-8/31/25.    Precautions: None.     Subjective: 5/10 resting pain today. Slight, temp decr sxs following jt mobs last time. All reaching ~ same. HEP going well.    Objective: AROM shoulder flex: ~150- pain. Abd: ~100- pain.     Treatment:   Therapeutic exercise: Tband row, Tband ER with elbows on table, prone scap 45, Tband IR- demo'd correctly.   Ball rolling up wall into flex- HEP 20x, 2x/day.  Ball rolling- small circles on wall- HEP 30x, 2x/day.    Manual therapy: IV- GH caud and AP in flex and abd// Flex: ~150. Abd: ~100.      Other: Reviewed reaching mechanics.    Assessment: Demo's HEP correctly. No change with jt mobs.    Plan: Incr shoulder stabilization exer.       "

## 2025-07-29 ENCOUNTER — TREATMENT (OUTPATIENT)
Dept: PHYSICAL THERAPY | Facility: CLINIC | Age: 65
End: 2025-07-29
Payer: COMMERCIAL

## 2025-07-29 DIAGNOSIS — S46.011D STRAIN OF TENDON OF RIGHT ROTATOR CUFF, SUBSEQUENT ENCOUNTER: ICD-10-CM

## 2025-07-29 DIAGNOSIS — S42.131D CLOSED DISPLACED FRACTURE OF CORACOID PROCESS OF RIGHT SHOULDER WITH ROUTINE HEALING, SUBSEQUENT ENCOUNTER: ICD-10-CM

## 2025-07-29 PROCEDURE — 97110 THERAPEUTIC EXERCISES: CPT | Mod: GP | Performed by: PHYSICAL THERAPIST

## 2025-07-29 PROCEDURE — 97140 MANUAL THERAPY 1/> REGIONS: CPT | Mod: GP | Performed by: PHYSICAL THERAPIST

## 2025-07-29 NOTE — PROGRESS NOTES
"Physical Therapy Follow-up    Patient Name: Omega Fox \"Emanuel\"  MRN: [id number]  Today's Date: 7/29/2025  Time Calculation  Start Time: 0150  Stop Time: 0228  Time Calculation (min): 38 min      Visit#: 5. 12 approved.  Cert dates: 4/22/25-8/31/25.    Precautions: None.     Subjective: 4/10 resting pain today. Had sharp right shoulder pain ~2-3 days ago sec to trying to close a stuck window at home. All reaching ~ same. HEP going well.    Objective: AROM shoulder flex: ~120- pain. Abd: ~90- pain. ER: wnl.    Treatment:   Therapeutic exercise: Tband row, Tband ER with elbows on table, prone scap 45, Tband IR, ball rolling up wall into flex, ball rolling- small circles on wall- demo'd correctly.     Manual therapy: IV- GH caud and AP in flex and abd// Flex: ~130. Abd: ~90.      Other: Reviewed reaching mechanics.    Assessment: Demo's HEP correctly. Slight incr ROM with rx. Remains irritable.    Plan: Incr shoulder stabilization exer.       "

## 2025-07-31 DIAGNOSIS — Z12.11 SCREENING FOR COLON CANCER: Primary | ICD-10-CM

## 2025-07-31 RX ORDER — POLYETHYLENE GLYCOL 3350, SODIUM SULFATE ANHYDROUS, SODIUM BICARBONATE, SODIUM CHLORIDE, POTASSIUM CHLORIDE 236; 22.74; 6.74; 5.86; 2.97 G/4L; G/4L; G/4L; G/4L; G/4L
4 POWDER, FOR SOLUTION ORAL ONCE
Qty: 4000 ML | Refills: 0 | Status: SHIPPED | OUTPATIENT
Start: 2025-07-31 | End: 2025-07-31

## 2025-07-31 NOTE — TELEPHONE ENCOUNTER
Pended colonoscopy prep [facility] for approval.  Instructions sent to patient via [employer].

## 2025-08-05 ENCOUNTER — TREATMENT (OUTPATIENT)
Dept: PHYSICAL THERAPY | Facility: CLINIC | Age: 65
End: 2025-08-05
Payer: COMMERCIAL

## 2025-08-05 DIAGNOSIS — S42.131D CLOSED DISPLACED FRACTURE OF CORACOID PROCESS OF RIGHT SHOULDER WITH ROUTINE HEALING, SUBSEQUENT ENCOUNTER: ICD-10-CM

## 2025-08-05 DIAGNOSIS — S46.011D STRAIN OF TENDON OF RIGHT ROTATOR CUFF, SUBSEQUENT ENCOUNTER: ICD-10-CM

## 2025-08-05 PROCEDURE — 97110 THERAPEUTIC EXERCISES: CPT | Mod: GP | Performed by: PHYSICAL THERAPIST

## 2025-08-05 PROCEDURE — 97140 MANUAL THERAPY 1/> REGIONS: CPT | Mod: GP | Performed by: PHYSICAL THERAPIST

## 2025-08-05 NOTE — PROGRESS NOTES
"Physical Therapy Follow-up    Patient Name: Omega Fox \"Emanuel\"  MRN: [id number]  Today's Date: 8/5/2025  Time Calculation  Start Time: 0150  Stop Time: 0232  Time Calculation (min): 42 min      Visit#: 6. 12 approved.  Cert dates: 4/22/25-8/31/25.    Precautions: None.     Subjective: 4/10 resting pain today. All reaching ~ same. HEP going well.    Objective: AROM shoulder flex: ~100- pain. Abd: ~100- pain. ER: wnl.    Treatment:   Therapeutic exercise: Tband row, Tband ER with elbows on table, prone scap 45, Tband IR, ball rolling up wall into flex, ball rolling- small circles on wall- demo'd correctly.     Manual therapy: IV- GH caud and AP in flex and abd// Flex: ~110. Abd: ~90.      Other: Reviewed reaching mechanics.    Assessment: Demo's HEP correctly. Min change with rx. Remains irritable.    Plan: Incr shoulder stabilization exer.       "

## 2025-08-08 ENCOUNTER — TREATMENT (OUTPATIENT)
Dept: PHYSICAL THERAPY | Facility: CLINIC | Age: 65
End: 2025-08-08
Payer: COMMERCIAL

## 2025-08-08 DIAGNOSIS — S42.131D CLOSED DISPLACED FRACTURE OF CORACOID PROCESS OF RIGHT SHOULDER WITH ROUTINE HEALING, SUBSEQUENT ENCOUNTER: ICD-10-CM

## 2025-08-08 DIAGNOSIS — S46.011D STRAIN OF TENDON OF RIGHT ROTATOR CUFF, SUBSEQUENT ENCOUNTER: ICD-10-CM

## 2025-08-08 PROCEDURE — 97110 THERAPEUTIC EXERCISES: CPT | Mod: GP | Performed by: PHYSICAL THERAPIST

## 2025-08-08 PROCEDURE — 97140 MANUAL THERAPY 1/> REGIONS: CPT | Mod: GP | Performed by: PHYSICAL THERAPIST

## 2025-08-08 NOTE — PROGRESS NOTES
"Physical Therapy Follow-up    Patient Name: Omega Fox \"Emanuel\"  MRN: [id number]  Today's Date: 8/8/2025  Time Calculation  Start Time: 0120  Stop Time: 0150  Time Calculation (min): 30 min      Visit#: 7. 12 approved.  Cert dates: 4/22/25-8/31/25.    Precautions: None.     Subjective: 4/10 resting pain today. All reaching ~ same. HEP going well.    Objective: AROM shoulder flex: ~110- pain. Abd: ~100- pain. ER: wnl. Hbb: ~L3- pain. Left ~T10.    Treatment:   Therapeutic exercise: Tband row, Tband ER with elbows on table, prone scap 45, Tband IR, ball rolling up wall into flex, ball rolling- small circles on wall- demo'd correctly.     Sleeper stretch 3x30 sec// flex: No change. Abd: No change.   Tband hand walks up wall- HEP 30 sec, 2x/day.    Manual therapy: Gentle cross fiber to ant and lat shoulder and prox arm// Flex: No change. Ext: No change.     Other: Reviewed reaching mechanics.    Assessment: Demo's HEP correctly. No change with rx. Remains irritable.    Plan: Incr shoulder stabilization exer.       "

## 2025-08-11 ENCOUNTER — TREATMENT (OUTPATIENT)
Dept: PHYSICAL THERAPY | Facility: CLINIC | Age: 65
End: 2025-08-11
Payer: COMMERCIAL

## 2025-08-11 DIAGNOSIS — S42.131D CLOSED DISPLACED FRACTURE OF CORACOID PROCESS OF RIGHT SHOULDER WITH ROUTINE HEALING, SUBSEQUENT ENCOUNTER: ICD-10-CM

## 2025-08-11 DIAGNOSIS — S46.011D STRAIN OF TENDON OF RIGHT ROTATOR CUFF, SUBSEQUENT ENCOUNTER: ICD-10-CM

## 2025-08-11 PROCEDURE — 97110 THERAPEUTIC EXERCISES: CPT | Mod: GP | Performed by: PHYSICAL THERAPIST

## 2025-08-15 ENCOUNTER — APPOINTMENT (OUTPATIENT)
Dept: PHYSICAL THERAPY | Facility: CLINIC | Age: 65
End: 2025-08-15
Payer: COMMERCIAL

## 2025-08-21 ENCOUNTER — TELEPHONE (OUTPATIENT)
Dept: PRIMARY CARE | Facility: CLINIC | Age: 65
End: 2025-08-21
Payer: COMMERCIAL

## 2025-08-21 ENCOUNTER — APPOINTMENT (OUTPATIENT)
Dept: GASTROENTEROLOGY | Facility: HOSPITAL | Age: 65
End: 2025-08-21
Payer: COMMERCIAL

## 2025-08-21 DIAGNOSIS — I10 PRIMARY HYPERTENSION: ICD-10-CM

## 2025-08-22 RX ORDER — AMLODIPINE BESYLATE 5 MG/1
5 TABLET ORAL DAILY
Qty: 90 TABLET | Refills: 0 | Status: SHIPPED | OUTPATIENT
Start: 2025-08-22 | End: 2025-11-20

## 2025-08-22 RX ORDER — LOSARTAN POTASSIUM 50 MG/1
50 TABLET ORAL DAILY
Qty: 90 TABLET | Refills: 0 | Status: SHIPPED | OUTPATIENT
Start: 2025-08-22

## 2025-08-24 DIAGNOSIS — I10 PRIMARY HYPERTENSION: ICD-10-CM

## 2025-08-25 RX ORDER — AMLODIPINE BESYLATE 5 MG/1
5 TABLET ORAL DAILY
Qty: 90 TABLET | Refills: 0 | Status: SHIPPED | OUTPATIENT
Start: 2025-08-25

## 2025-08-26 ENCOUNTER — OFFICE VISIT (OUTPATIENT)
Dept: ORTHOPEDIC SURGERY | Facility: CLINIC | Age: 65
End: 2025-08-26
Payer: COMMERCIAL

## 2025-08-26 DIAGNOSIS — M25.511 ACUTE PAIN OF RIGHT SHOULDER: ICD-10-CM

## 2025-08-26 DIAGNOSIS — S46.011A ROTATOR CUFF STRAIN, RIGHT, INITIAL ENCOUNTER: Primary | ICD-10-CM

## 2025-08-26 DIAGNOSIS — S42.131A DISPLACED FRACTURE OF CORACOID PROCESS, RIGHT SHOULDER, INITIAL ENCOUNTER FOR CLOSED FRACTURE: ICD-10-CM

## 2025-08-26 PROCEDURE — 20610 DRAIN/INJ JOINT/BURSA W/O US: CPT | Mod: RT | Performed by: STUDENT IN AN ORGANIZED HEALTH CARE EDUCATION/TRAINING PROGRAM

## 2025-08-26 PROCEDURE — 2500000004 HC RX 250 GENERAL PHARMACY W/ HCPCS (ALT 636 FOR OP/ED): Performed by: STUDENT IN AN ORGANIZED HEALTH CARE EDUCATION/TRAINING PROGRAM

## 2025-08-26 PROCEDURE — 99212 OFFICE O/P EST SF 10 MIN: CPT | Mod: 25

## 2025-08-26 PROCEDURE — 99214 OFFICE O/P EST MOD 30 MIN: CPT | Performed by: STUDENT IN AN ORGANIZED HEALTH CARE EDUCATION/TRAINING PROGRAM

## 2025-08-26 RX ORDER — TRIAMCINOLONE ACETONIDE 40 MG/ML
80 INJECTION, SUSPENSION INTRA-ARTICULAR; INTRAMUSCULAR
Status: COMPLETED | OUTPATIENT
Start: 2025-08-26 | End: 2025-08-26

## 2025-08-26 RX ORDER — LIDOCAINE HYDROCHLORIDE 10 MG/ML
5 INJECTION, SOLUTION INFILTRATION; PERINEURAL
Status: COMPLETED | OUTPATIENT
Start: 2025-08-26 | End: 2025-08-26

## 2025-08-26 RX ADMIN — TRIAMCINOLONE ACETONIDE 80 MG: 40 INJECTION, SUSPENSION INTRA-ARTICULAR; INTRAMUSCULAR at 12:06

## 2025-08-26 RX ADMIN — LIDOCAINE HYDROCHLORIDE 5 ML: 10 INJECTION, SOLUTION INFILTRATION; PERINEURAL at 12:06

## 2025-08-27 ENCOUNTER — HOSPITAL ENCOUNTER (OUTPATIENT)
Dept: OPERATING ROOM | Facility: CLINIC | Age: 65
Setting detail: OUTPATIENT SURGERY
Discharge: HOME | End: 2025-08-27
Payer: COMMERCIAL

## 2025-08-27 DIAGNOSIS — R19.5 POSITIVE COLORECTAL CANCER SCREENING USING COLOGUARD TEST: ICD-10-CM
